# Patient Record
Sex: FEMALE | Race: WHITE | NOT HISPANIC OR LATINO | URBAN - METROPOLITAN AREA
[De-identification: names, ages, dates, MRNs, and addresses within clinical notes are randomized per-mention and may not be internally consistent; named-entity substitution may affect disease eponyms.]

---

## 2017-04-03 ENCOUNTER — OUTPATIENT (OUTPATIENT)
Dept: OUTPATIENT SERVICES | Facility: HOSPITAL | Age: 62
LOS: 1 days | Discharge: HOME | End: 2017-04-03

## 2017-06-27 DIAGNOSIS — N39.0 URINARY TRACT INFECTION, SITE NOT SPECIFIED: ICD-10-CM

## 2018-04-06 PROBLEM — Z00.00 ENCOUNTER FOR PREVENTIVE HEALTH EXAMINATION: Status: ACTIVE | Noted: 2018-04-06

## 2018-04-30 ENCOUNTER — APPOINTMENT (OUTPATIENT)
Age: 63
End: 2018-04-30

## 2018-04-30 ENCOUNTER — OUTPATIENT (OUTPATIENT)
Dept: OUTPATIENT SERVICES | Facility: HOSPITAL | Age: 63
LOS: 1 days | Discharge: HOME | End: 2018-04-30

## 2018-04-30 VITALS
HEART RATE: 63 BPM | DIASTOLIC BLOOD PRESSURE: 80 MMHG | SYSTOLIC BLOOD PRESSURE: 134 MMHG | BODY MASS INDEX: 23.23 KG/M2 | WEIGHT: 148 LBS | HEIGHT: 67 IN

## 2018-04-30 DIAGNOSIS — N95.2 POSTMENOPAUSAL ATROPHIC VAGINITIS: ICD-10-CM

## 2018-04-30 DIAGNOSIS — Z86.39 PERSONAL HISTORY OF OTHER ENDOCRINE, NUTRITIONAL AND METABOLIC DISEASE: ICD-10-CM

## 2018-04-30 DIAGNOSIS — Z82.49 FAMILY HISTORY OF ISCHEMIC HEART DISEASE AND OTHER DISEASES OF THE CIRCULATORY SYSTEM: ICD-10-CM

## 2018-04-30 DIAGNOSIS — Z78.9 OTHER SPECIFIED HEALTH STATUS: ICD-10-CM

## 2018-04-30 RX ORDER — OMEPRAZOLE, SODIUM BICARBONATE 40; 1100 MG/1; MG/1
40-1100 CAPSULE ORAL
Refills: 0 | Status: ACTIVE | COMMUNITY

## 2018-04-30 RX ORDER — ALPRAZOLAM 0.25 MG/1
0.25 TABLET ORAL
Refills: 0 | Status: ACTIVE | COMMUNITY

## 2018-05-01 DIAGNOSIS — R39.11 HESITANCY OF MICTURITION: ICD-10-CM

## 2018-05-02 LAB — BACTERIA UR CULT: NORMAL

## 2018-05-07 ENCOUNTER — APPOINTMENT (OUTPATIENT)
Age: 63
End: 2018-05-07

## 2018-05-11 LAB
APPEARANCE: CLEAR
BILIRUBIN URINE: NEGATIVE
BLOOD URINE: NEGATIVE
COLOR: YELLOW
GLUCOSE QUALITATIVE U: NEGATIVE MG/DL
KETONES URINE: NEGATIVE
LEUKOCYTE ESTERASE URINE: NEGATIVE
NITRITE URINE: NEGATIVE
PH URINE: 6.5
PROTEIN URINE: NEGATIVE MG/DL
SPECIFIC GRAVITY URINE: 1
UROBILINOGEN URINE: NEGATIVE MG/DL

## 2018-09-06 ENCOUNTER — APPOINTMENT (OUTPATIENT)
Age: 63
End: 2018-09-06

## 2018-10-04 ENCOUNTER — APPOINTMENT (OUTPATIENT)
Age: 63
End: 2018-10-04

## 2018-10-04 VITALS
DIASTOLIC BLOOD PRESSURE: 80 MMHG | BODY MASS INDEX: 23.23 KG/M2 | WEIGHT: 148 LBS | SYSTOLIC BLOOD PRESSURE: 130 MMHG | HEART RATE: 65 BPM | HEIGHT: 67 IN

## 2018-10-04 DIAGNOSIS — N81.2 INCOMPLETE UTEROVAGINAL PROLAPSE: ICD-10-CM

## 2018-10-04 DIAGNOSIS — N81.4 UTEROVAGINAL PROLAPSE, UNSPECIFIED: ICD-10-CM

## 2018-10-11 ENCOUNTER — CHART COPY (OUTPATIENT)
Age: 63
End: 2018-10-11

## 2018-10-16 ENCOUNTER — OUTPATIENT (OUTPATIENT)
Dept: OUTPATIENT SERVICES | Facility: HOSPITAL | Age: 63
LOS: 1 days | Discharge: HOME | End: 2018-10-16

## 2018-10-16 VITALS
SYSTOLIC BLOOD PRESSURE: 114 MMHG | DIASTOLIC BLOOD PRESSURE: 77 MMHG | WEIGHT: 147.93 LBS | OXYGEN SATURATION: 99 % | HEART RATE: 65 BPM | TEMPERATURE: 97 F | RESPIRATION RATE: 16 BRPM | HEIGHT: 67 IN

## 2018-10-16 DIAGNOSIS — Z01.818 ENCOUNTER FOR OTHER PREPROCEDURAL EXAMINATION: ICD-10-CM

## 2018-10-16 DIAGNOSIS — N81.3 COMPLETE UTEROVAGINAL PROLAPSE: ICD-10-CM

## 2018-10-16 DIAGNOSIS — N81.10 CYSTOCELE, UNSPECIFIED: Chronic | ICD-10-CM

## 2018-10-16 DIAGNOSIS — Z96.7 PRESENCE OF OTHER BONE AND TENDON IMPLANTS: Chronic | ICD-10-CM

## 2018-10-16 LAB
ALBUMIN SERPL ELPH-MCNC: 4.6 G/DL — SIGNIFICANT CHANGE UP (ref 3.5–5.2)
ALP SERPL-CCNC: 106 U/L — SIGNIFICANT CHANGE UP (ref 30–115)
ALT FLD-CCNC: 13 U/L — SIGNIFICANT CHANGE UP (ref 0–41)
ANION GAP SERPL CALC-SCNC: 13 MMOL/L — SIGNIFICANT CHANGE UP (ref 7–14)
APPEARANCE UR: CLEAR — SIGNIFICANT CHANGE UP
APTT BLD: 36.5 SEC — SIGNIFICANT CHANGE UP (ref 27–39.2)
AST SERPL-CCNC: 14 U/L — SIGNIFICANT CHANGE UP (ref 0–41)
BASOPHILS # BLD AUTO: 0.06 K/UL — SIGNIFICANT CHANGE UP (ref 0–0.2)
BASOPHILS NFR BLD AUTO: 0.9 % — SIGNIFICANT CHANGE UP (ref 0–1)
BILIRUB SERPL-MCNC: 0.6 MG/DL — SIGNIFICANT CHANGE UP (ref 0.2–1.2)
BILIRUB UR-MCNC: NEGATIVE — SIGNIFICANT CHANGE UP
BLD GP AB SCN SERPL QL: SIGNIFICANT CHANGE UP
BUN SERPL-MCNC: 17 MG/DL — SIGNIFICANT CHANGE UP (ref 10–20)
CALCIUM SERPL-MCNC: 9.5 MG/DL — SIGNIFICANT CHANGE UP (ref 8.5–10.1)
CHLORIDE SERPL-SCNC: 99 MMOL/L — SIGNIFICANT CHANGE UP (ref 98–110)
CO2 SERPL-SCNC: 29 MMOL/L — SIGNIFICANT CHANGE UP (ref 17–32)
COLOR SPEC: YELLOW — SIGNIFICANT CHANGE UP
CREAT SERPL-MCNC: 0.8 MG/DL — SIGNIFICANT CHANGE UP (ref 0.7–1.5)
DIFF PNL FLD: ABNORMAL
EOSINOPHIL # BLD AUTO: 0.04 K/UL — SIGNIFICANT CHANGE UP (ref 0–0.7)
EOSINOPHIL NFR BLD AUTO: 0.6 % — SIGNIFICANT CHANGE UP (ref 0–8)
EPI CELLS # UR: ABNORMAL /HPF
GLUCOSE SERPL-MCNC: 85 MG/DL — SIGNIFICANT CHANGE UP (ref 70–99)
GLUCOSE UR QL: NEGATIVE MG/DL — SIGNIFICANT CHANGE UP
HCT VFR BLD CALC: 34.2 % — LOW (ref 37–47)
HGB BLD-MCNC: 11 G/DL — LOW (ref 12–16)
IMM GRANULOCYTES NFR BLD AUTO: 0.1 % — SIGNIFICANT CHANGE UP (ref 0.1–0.3)
INR BLD: 1.17 RATIO — SIGNIFICANT CHANGE UP (ref 0.65–1.3)
KETONES UR-MCNC: NEGATIVE — SIGNIFICANT CHANGE UP
LEUKOCYTE ESTERASE UR-ACNC: NEGATIVE — SIGNIFICANT CHANGE UP
LYMPHOCYTES # BLD AUTO: 2.45 K/UL — SIGNIFICANT CHANGE UP (ref 1.2–3.4)
LYMPHOCYTES # BLD AUTO: 35.3 % — SIGNIFICANT CHANGE UP (ref 20.5–51.1)
MCHC RBC-ENTMCNC: 27.8 PG — SIGNIFICANT CHANGE UP (ref 27–31)
MCHC RBC-ENTMCNC: 32.2 G/DL — SIGNIFICANT CHANGE UP (ref 32–37)
MCV RBC AUTO: 86.4 FL — SIGNIFICANT CHANGE UP (ref 81–99)
MONOCYTES # BLD AUTO: 0.53 K/UL — SIGNIFICANT CHANGE UP (ref 0.1–0.6)
MONOCYTES NFR BLD AUTO: 7.6 % — SIGNIFICANT CHANGE UP (ref 1.7–9.3)
NEUTROPHILS # BLD AUTO: 3.85 K/UL — SIGNIFICANT CHANGE UP (ref 1.4–6.5)
NEUTROPHILS NFR BLD AUTO: 55.5 % — SIGNIFICANT CHANGE UP (ref 42.2–75.2)
NITRITE UR-MCNC: NEGATIVE — SIGNIFICANT CHANGE UP
NRBC # BLD: 0 /100 WBCS — SIGNIFICANT CHANGE UP (ref 0–0)
PH UR: 6 — SIGNIFICANT CHANGE UP (ref 5–8)
PLATELET # BLD AUTO: 296 K/UL — SIGNIFICANT CHANGE UP (ref 130–400)
POTASSIUM SERPL-MCNC: 4.3 MMOL/L — SIGNIFICANT CHANGE UP (ref 3.5–5)
POTASSIUM SERPL-SCNC: 4.3 MMOL/L — SIGNIFICANT CHANGE UP (ref 3.5–5)
PROT SERPL-MCNC: 7.3 G/DL — SIGNIFICANT CHANGE UP (ref 6–8)
PROT UR-MCNC: NEGATIVE MG/DL — SIGNIFICANT CHANGE UP
PROTHROM AB SERPL-ACNC: 13.4 SEC — HIGH (ref 9.95–12.87)
RBC # BLD: 3.96 M/UL — LOW (ref 4.2–5.4)
RBC # FLD: 12.5 % — SIGNIFICANT CHANGE UP (ref 11.5–14.5)
RBC CASTS # UR COMP ASSIST: SIGNIFICANT CHANGE UP /HPF
SODIUM SERPL-SCNC: 141 MMOL/L — SIGNIFICANT CHANGE UP (ref 135–146)
SP GR SPEC: 1.01 — SIGNIFICANT CHANGE UP (ref 1.01–1.03)
TYPE + AB SCN PNL BLD: SIGNIFICANT CHANGE UP
UROBILINOGEN FLD QL: 0.2 MG/DL — SIGNIFICANT CHANGE UP (ref 0.2–0.2)
WBC # BLD: 6.94 K/UL — SIGNIFICANT CHANGE UP (ref 4.8–10.8)
WBC # FLD AUTO: 6.94 K/UL — SIGNIFICANT CHANGE UP (ref 4.8–10.8)
WBC UR QL: SIGNIFICANT CHANGE UP /HPF

## 2018-10-16 NOTE — H&P PST ADULT - PSH
Female bladder prolapse  s/p repair 15 yrs ago  S/P ORIF (open reduction internal fixation) fracture  left wrist

## 2018-10-16 NOTE — H&P PST ADULT - PMH
Cellulitis  3x past 9 mo right foot& ankle, resolved  GERD (gastroesophageal reflux disease)    HTN (hypertension)    IBS (irritable bowel syndrome)

## 2018-10-16 NOTE — H&P PST ADULT - REASON FOR ADMISSION
62 yo female presents w/ c/o uterine& vaginal prolapse, pt is scheduled for hysterectomy& "lift";  denies chest pain, palpitations, shortness of breath, dyspnea, or dysuria. exercise tolerance: 4 blocks/ flights of stairs w/o sob

## 2018-10-17 LAB
CULTURE RESULTS: SIGNIFICANT CHANGE UP
SPECIMEN SOURCE: SIGNIFICANT CHANGE UP

## 2018-10-29 ENCOUNTER — APPOINTMENT (OUTPATIENT)
Age: 63
End: 2018-10-29

## 2018-10-30 ENCOUNTER — OUTPATIENT (OUTPATIENT)
Dept: OUTPATIENT SERVICES | Facility: HOSPITAL | Age: 63
LOS: 1 days | Discharge: HOME | End: 2018-10-30

## 2018-10-30 DIAGNOSIS — Z96.7 PRESENCE OF OTHER BONE AND TENDON IMPLANTS: Chronic | ICD-10-CM

## 2018-10-30 DIAGNOSIS — N81.10 CYSTOCELE, UNSPECIFIED: Chronic | ICD-10-CM

## 2018-10-31 PROBLEM — I10 ESSENTIAL (PRIMARY) HYPERTENSION: Chronic | Status: ACTIVE | Noted: 2018-10-16

## 2018-10-31 PROBLEM — K58.9 IRRITABLE BOWEL SYNDROME WITHOUT DIARRHEA: Chronic | Status: ACTIVE | Noted: 2018-10-16

## 2018-10-31 PROBLEM — L03.90 CELLULITIS, UNSPECIFIED: Chronic | Status: ACTIVE | Noted: 2018-10-16

## 2018-10-31 PROBLEM — K21.9 GASTRO-ESOPHAGEAL REFLUX DISEASE WITHOUT ESOPHAGITIS: Chronic | Status: ACTIVE | Noted: 2018-10-16

## 2018-11-01 ENCOUNTER — CLINICAL ADVICE (OUTPATIENT)
Age: 63
End: 2018-11-01

## 2018-11-05 DIAGNOSIS — Z02.9 ENCOUNTER FOR ADMINISTRATIVE EXAMINATIONS, UNSPECIFIED: ICD-10-CM

## 2018-11-13 ENCOUNTER — INPATIENT (INPATIENT)
Facility: HOSPITAL | Age: 63
LOS: 1 days | Discharge: HOME | End: 2018-11-15
Attending: OBSTETRICS & GYNECOLOGY | Admitting: OBSTETRICS & GYNECOLOGY

## 2018-11-13 ENCOUNTER — RESULT REVIEW (OUTPATIENT)
Age: 63
End: 2018-11-13

## 2018-11-13 VITALS
HEIGHT: 67 IN | SYSTOLIC BLOOD PRESSURE: 143 MMHG | TEMPERATURE: 98 F | WEIGHT: 147.05 LBS | DIASTOLIC BLOOD PRESSURE: 80 MMHG | HEART RATE: 64 BPM | RESPIRATION RATE: 18 BRPM

## 2018-11-13 DIAGNOSIS — Z96.7 PRESENCE OF OTHER BONE AND TENDON IMPLANTS: Chronic | ICD-10-CM

## 2018-11-13 DIAGNOSIS — N81.10 CYSTOCELE, UNSPECIFIED: Chronic | ICD-10-CM

## 2018-11-13 LAB
BLD GP AB SCN SERPL QL: SIGNIFICANT CHANGE UP
GLUCOSE BLDC GLUCOMTR-MCNC: 172 MG/DL — HIGH (ref 70–99)
TYPE + AB SCN PNL BLD: SIGNIFICANT CHANGE UP

## 2018-11-13 RX ORDER — PANTOPRAZOLE SODIUM 20 MG/1
40 TABLET, DELAYED RELEASE ORAL AT BEDTIME
Qty: 0 | Refills: 0 | Status: DISCONTINUED | OUTPATIENT
Start: 2018-11-13 | End: 2018-11-15

## 2018-11-13 RX ORDER — ONDANSETRON 8 MG/1
8 TABLET, FILM COATED ORAL ONCE
Qty: 0 | Refills: 0 | Status: DISCONTINUED | OUTPATIENT
Start: 2018-11-13 | End: 2018-11-13

## 2018-11-13 RX ORDER — ENOXAPARIN SODIUM 100 MG/ML
40 INJECTION SUBCUTANEOUS DAILY
Qty: 0 | Refills: 0 | Status: DISCONTINUED | OUTPATIENT
Start: 2018-11-14 | End: 2018-11-15

## 2018-11-13 RX ORDER — CICLOPIROX OLAMINE 7.7 MG/G
1 CREAM TOPICAL
Qty: 0 | Refills: 0 | COMMUNITY

## 2018-11-13 RX ORDER — CHLORPROMAZINE HCL 10 MG
5 TABLET ORAL ONCE
Qty: 0 | Refills: 0 | Status: DISCONTINUED | OUTPATIENT
Start: 2018-11-13 | End: 2018-11-15

## 2018-11-13 RX ORDER — ALPRAZOLAM 0.25 MG
0.12 TABLET ORAL
Qty: 0 | Refills: 0 | COMMUNITY

## 2018-11-13 RX ORDER — METOCLOPRAMIDE HCL 10 MG
10 TABLET ORAL ONCE
Qty: 0 | Refills: 0 | Status: COMPLETED | OUTPATIENT
Start: 2018-11-13 | End: 2018-11-13

## 2018-11-13 RX ORDER — SODIUM CHLORIDE 9 MG/ML
1000 INJECTION, SOLUTION INTRAVENOUS
Qty: 0 | Refills: 0 | Status: DISCONTINUED | OUTPATIENT
Start: 2018-11-13 | End: 2018-11-13

## 2018-11-13 RX ORDER — SODIUM CHLORIDE 9 MG/ML
1000 INJECTION, SOLUTION INTRAVENOUS
Qty: 0 | Refills: 0 | Status: DISCONTINUED | OUTPATIENT
Start: 2018-11-13 | End: 2018-11-14

## 2018-11-13 RX ORDER — HYDROMORPHONE HYDROCHLORIDE 2 MG/ML
0.5 INJECTION INTRAMUSCULAR; INTRAVENOUS; SUBCUTANEOUS
Qty: 0 | Refills: 0 | Status: DISCONTINUED | OUTPATIENT
Start: 2018-11-13 | End: 2018-11-13

## 2018-11-13 RX ORDER — ENOXAPARIN SODIUM 100 MG/ML
40 INJECTION SUBCUTANEOUS ONCE
Qty: 0 | Refills: 0 | Status: COMPLETED | OUTPATIENT
Start: 2018-11-13 | End: 2018-11-13

## 2018-11-13 RX ORDER — APREPITANT 80 MG/1
40 CAPSULE ORAL ONCE
Qty: 0 | Refills: 0 | Status: COMPLETED | OUTPATIENT
Start: 2018-11-13 | End: 2018-11-13

## 2018-11-13 RX ORDER — FAMOTIDINE 10 MG/ML
0 INJECTION INTRAVENOUS
Qty: 0 | Refills: 0 | COMMUNITY

## 2018-11-13 RX ORDER — DEXAMETHASONE 0.5 MG/5ML
4 ELIXIR ORAL ONCE
Qty: 0 | Refills: 0 | Status: COMPLETED | OUTPATIENT
Start: 2018-11-13 | End: 2018-11-13

## 2018-11-13 RX ORDER — PHENAZOPYRIDINE HCL 100 MG
200 TABLET ORAL ONCE
Qty: 0 | Refills: 0 | Status: COMPLETED | OUTPATIENT
Start: 2018-11-13 | End: 2018-11-13

## 2018-11-13 RX ORDER — IBUPROFEN 200 MG
600 TABLET ORAL EVERY 6 HOURS
Qty: 0 | Refills: 0 | Status: DISCONTINUED | OUTPATIENT
Start: 2018-11-13 | End: 2018-11-15

## 2018-11-13 RX ORDER — ACETAMINOPHEN 500 MG
650 TABLET ORAL EVERY 6 HOURS
Qty: 0 | Refills: 0 | Status: DISCONTINUED | OUTPATIENT
Start: 2018-11-13 | End: 2018-11-15

## 2018-11-13 RX ORDER — HYDROMORPHONE HYDROCHLORIDE 2 MG/ML
2 INJECTION INTRAMUSCULAR; INTRAVENOUS; SUBCUTANEOUS EVERY 6 HOURS
Qty: 0 | Refills: 0 | Status: DISCONTINUED | OUTPATIENT
Start: 2018-11-13 | End: 2018-11-15

## 2018-11-13 RX ORDER — MEPERIDINE HYDROCHLORIDE 50 MG/ML
12.5 INJECTION INTRAMUSCULAR; INTRAVENOUS; SUBCUTANEOUS
Qty: 0 | Refills: 0 | Status: DISCONTINUED | OUTPATIENT
Start: 2018-11-13 | End: 2018-11-13

## 2018-11-13 RX ORDER — ONDANSETRON 8 MG/1
4 TABLET, FILM COATED ORAL ONCE
Qty: 0 | Refills: 0 | Status: COMPLETED | OUTPATIENT
Start: 2018-11-13 | End: 2018-11-13

## 2018-11-13 RX ORDER — HYDROMORPHONE HYDROCHLORIDE 2 MG/ML
1 INJECTION INTRAMUSCULAR; INTRAVENOUS; SUBCUTANEOUS
Qty: 0 | Refills: 0 | Status: DISCONTINUED | OUTPATIENT
Start: 2018-11-13 | End: 2018-11-13

## 2018-11-13 RX ADMIN — HYDROMORPHONE HYDROCHLORIDE 1 MILLIGRAM(S): 2 INJECTION INTRAMUSCULAR; INTRAVENOUS; SUBCUTANEOUS at 22:25

## 2018-11-13 RX ADMIN — SODIUM CHLORIDE 100 MILLILITER(S): 9 INJECTION, SOLUTION INTRAVENOUS at 22:39

## 2018-11-13 RX ADMIN — Medication 4 MILLIGRAM(S): at 22:13

## 2018-11-13 RX ADMIN — HYDROMORPHONE HYDROCHLORIDE 1 MILLIGRAM(S): 2 INJECTION INTRAMUSCULAR; INTRAVENOUS; SUBCUTANEOUS at 21:49

## 2018-11-13 RX ADMIN — ENOXAPARIN SODIUM 40 MILLIGRAM(S): 100 INJECTION SUBCUTANEOUS at 15:40

## 2018-11-13 RX ADMIN — PANTOPRAZOLE SODIUM 40 MILLIGRAM(S): 20 TABLET, DELAYED RELEASE ORAL at 22:17

## 2018-11-13 RX ADMIN — SODIUM CHLORIDE 120 MILLILITER(S): 9 INJECTION, SOLUTION INTRAVENOUS at 22:00

## 2018-11-13 RX ADMIN — APREPITANT 40 MILLIGRAM(S): 80 CAPSULE ORAL at 15:41

## 2018-11-13 RX ADMIN — MEPERIDINE HYDROCHLORIDE 12.5 MILLIGRAM(S): 50 INJECTION INTRAMUSCULAR; INTRAVENOUS; SUBCUTANEOUS at 22:05

## 2018-11-13 RX ADMIN — ONDANSETRON 4 MILLIGRAM(S): 8 TABLET, FILM COATED ORAL at 23:07

## 2018-11-13 RX ADMIN — HYDROMORPHONE HYDROCHLORIDE 1 MILLIGRAM(S): 2 INJECTION INTRAMUSCULAR; INTRAVENOUS; SUBCUTANEOUS at 22:05

## 2018-11-13 RX ADMIN — MEPERIDINE HYDROCHLORIDE 12.5 MILLIGRAM(S): 50 INJECTION INTRAMUSCULAR; INTRAVENOUS; SUBCUTANEOUS at 22:20

## 2018-11-13 RX ADMIN — Medication 104 MILLIGRAM(S): at 21:49

## 2018-11-13 RX ADMIN — Medication 200 MILLIGRAM(S): at 12:54

## 2018-11-13 RX ADMIN — MEPERIDINE HYDROCHLORIDE 12.5 MILLIGRAM(S): 50 INJECTION INTRAMUSCULAR; INTRAVENOUS; SUBCUTANEOUS at 21:50

## 2018-11-13 NOTE — CHART NOTE - NSCHARTNOTEFT_GEN_A_CORE
PACU ANESTHESIA ADMISSION NOTE      ____ Intubated  TV:______       Rate: ______      FiO2: ______    __x__ Patent Airway    __x__ Full return of protective reflexes    ____ Full recovery from anesthesia / sedation to baseline status    Vitals:  HR 93  /66  RR 15  O2sat. 97%  Temp: 36.8C      Mental Status:  __x__ Awake   __x___ Alert   _____ Drowsy   _____ Sedated    Nausea/Vomiting: ____ Yes, See Post - Op Orders      __x__ No    Pain Scale (0-10): __x___    Treatment: ____ None    _x___ See Post - Op/PCA Orders    Post - Operative Fluids:   ____ Oral   __x__ See Post - Op Orders    Plan:  Discharge to:   ____Home       ___x__Floor      _____Critical Care    _____ Other:_________________    Comments: s/p general anesthesia with ETT. No anesthesia complications. Pt's condition is stable in PACU. Full report is given to PACU RN.

## 2018-11-13 NOTE — PROGRESS NOTE ADULT - SUBJECTIVE AND OBJECTIVE BOX
DATE OF PROCEDURE: 11/13/2018  SURGEON: Elvira Lopez MD   ASSISTANT: Dr Mendle Hollis PGY 4, Dr Dalton Lord PGY 2    PREOPERATIVE DIAGNOSES:  1. Uterovaginal prolapse, incomplete     POSTOPERATIVE DIAGNOSES:   1. Uterovaginal prolapse, incomplete    PROCEDURES PERFORMED:  1. Robotic-assisted total laparoscopic hysterectomy  2. Robotic-assisted sacral colpopexy.   3. Cystourethroscopy.     COMPLICATIONS: None.   DISPOSITION: Stable to PACU.   DRAINS: Matthew catheter.   ANESTHESIA: General via an endotracheal tube   INTRAVENOUS FLUIDS: 2000 mL crystalloid.   ESTIMATED BLOOD LOSS: 150 mL.   URINE OUTPUT: 800 mL.   SPECIMENS: Uterus, cervix   FINDINGS: Cystocele, uterine prolapse, as well as rectocele.  Intraperitoneal findings revealed a normal-sized uterus and normal tubes  and ovaries bilaterally. The vaginal vault, as well as the rectal vault  were examined at the end of the case and noted to be free of mesh exposure,  perforation, or foreign body.     INDICATION: The patient is a 63-year-old woman who presented with pelvic organ prolapse that had become increasingly bothersome and impacting her quality of life. She was noted to have a symptomatic cystocele, rectocele and uterine prolapse. There was no evidence of preoperative stress urinary incontinence. After a thorough discussion of her options, both conservative and surgical, the patient elected to proceed with surgery. Informed consent was obtained for the above-stated procedures. All of her questions were answered to her satisfaction. The patient expressed understanding and agreement to surgical plan. The rationale usage of synthetic polypropylene type 1 macroporous mesh was discussed and the safety profile when used in the setting of a robotic-assisted sacral colpopexy was explained to her understanding. The risks and benefits were discussed. The patient was counseled about removal of the tubes and the ovaries since she was postmenopausal to decrease the risk of ovarian cancer. The patient and her  voiced understanding but did not want the removal of the tubes and ovaries unless they looked abnormal.The patient agreed to proceed.     DESCRIPTION OF PROCEDURE: The patient was brought to the operating room where she was placed in the supine position. Sequential compression devices were placed on both lower extremities for DVT prophylaxis. The patient was repositioned in lithotomy using hydraulic Sarmad stirrups. General anesthesia was then administered via an endotracheal tube and found to be adequate. The patient received antibiotics for an infectious prophylaxis. She was then examined, prepped and draped in the usual sterile fashion. A  formal timeout was performed with all surgical team members present and in agreement with the surgical plan.  A Matthew catheter was inserted under sterile technique to drain the bladder. Attention was then turned to the patient's vagina. A standard sized VCare manipulator was placed transcervically in order to aid with manipulation of the uterus intraoperatively. Attention was then turned to the anterior abdominal wall. A Veress needle was placed transabdominally and CO2 pneumoperitoneum was achieved. The skin incisions were placed in the usual fashion supraumbilically as well as the right and left upper quadrants and the ports were placed under direct laparoscopic guidance. Ports were placed and secured. The patient was then placed in steep Trendelenburg and the robot was side docked to the patient's left side. The robotic instruments were secured and the patient was secured. I then broke scrub and sat at the robotic physician console. Attention was initially turned to surveying the patient's pelvic floor anatomy. The uterus, cervix, bilateral fallopian tubes and ovaries appeared normal grossly. The ureters were examined and noted to be vermiculating in their usual anatomic location. Beginning on the patient's left side, the  round ligament was desiccated, , transected, and developed. The anterior and posterior leaflets of the broad ligament were developed. The infundibulopelvic ligament was then placed onto stretch, desiccated and met at the level of the broad ligament. The left side of the uterus was then skeletonized. The anterior and posterior leaflets of the broad ligament were further developed. Anteriorly, the bladder flap was created. The rectovaginal and vesicovaginal spaces were developed posteriorly. The uterine artery was isolated. The uterine artery was then desiccated thoroughly and there was some blanching noted on the patient's left side of the uterus. Similar maneuvers were performed on the patient's right side. Once there was blanching noted, a colpotomy was performed circumferentially at the level of the VCare ring prominence. The uterus and cervix were then delivered through the vagina. The vaginal cuff was  then reapproximated using a 0 quill suture in a running fashion in a tension-free manner. Another imbricating layer was then performed using the same 0 quill suture and this was well approximated.   There was some moderate bleeding from the left side wall at the level of the distal portion of the uterine. Further dissection of the ureter at that side then allowed me to cauterize the vessel and hemostasis was established. I placed surgical at that location.    I then turned my attention to performing the sacrocolpopexy. An EEA sizer was placed in the vagina. The rectovaginal space was initially developed using sharp and blunt dissection. Bleeders were made hemostatic using electrocautery. Similarly, the vesicovaginal space was developed using blunt and sharp dissection and bleeding was made hemostatic using electrocautery. I then turned my attention towards the sacral promontory. The peritoneum overlying the sacral promontory was palpated, elevated and then skeletonized downward in a caudate fashion to develop the presacral and retrorectal spaces. Attention was then turned back to the vagina. Beginning with the posterior vaginal wall, the Gynemesh material was  trimmed and brought to the field 3 cm x 15 cm. A CV2 Fargo-Ousmane suture was then used to place 6 sutures of CV2 in an interrupted fashion to adequately distribute the tension. Similarly, a 3 cm strip of Gynemesh was brought into the field and attached to the anterior vaginal wall using CV2 Fargo-Ousmane sutures. Both tails were attached to each other and held. A  monoderm quill suture was then used to retroperitonealize the mesh on the patient's left side. The tail was then brought towards the sacral promontory and sutured  to the sacral promontory in a tension-free manner using 2 separate CVO Fargo-Ousmane sutures with the aid of an EEA sizer in the vagina. The rest of the mesh was trimmed. The remainder of the mesh was then  retroperitonealized using a monoderm quill suture on the right side of the vagina towards the sacral promontory. Irrigation was performed. Hemostasis was confirmed. Instruments, needles and lap counts were noted to be correct. At this point, I then scrubbed back into the surgical field and  left the physician robotic console.     The vagina was inspected and noted to be free of mesh exposure or suture. A rectal exam was performed which revealed no foreign body in the rectal vault. The Matthew catheter was then removed. A  cystourethroscopy was performed using a 22 Kiswahili 30 degree cystoscope. The bladder was instilled  with sterile water and examined thoroughly in a standard fashion making sequential sweeps from the dome to the urethrovesical junction clockwise and then counterclockwise. All areas of the bladder appeared intact. The urothelium and bladder wall appeared normal. There was no foreign body in the  bladder. There was no perforation of the bladder. There was excellent efflux of Pyridium tinged urine from each ureteral orifice. The urethral lumen was examined under distention as the cystoscope was retracted and noted to be free of mesh exposure, perforation or abnormality. The cystoscope was then removed and a new Matthew catheter was then placed. While the abdominal  undocked the robot, released CO2 pneumoperitoneum, removed the ports and reapproximated the skin incisions subcuticularly with Monocryl, I deviated my attention vaginally. There was excellent support of the anterior and apical and posterior compartments of the vagina. There was no need for a repair vaginally. The vagina was reinspected and noted to be free of mesh exposure or suture.    Ray-Taqueria, needle and instrument counts were noted to be correct x 2. The patient tolerated the procedure well. Vaginal exam revealed no evidence of any mesh exposure or ridging inside the vagina.  A rectal exam revealed no foreign body in the rectal vault. The patient was then awoken, extubated and transferred to the PACU in stable condition.

## 2018-11-13 NOTE — BRIEF OPERATIVE NOTE - OPERATION/FINDINGS
EUA: anterior prolapse and cervical prolapse, approximately 5 cm   Cystoscopy: bilateral jets seen, normal bladder  Laparoscopy: bilaterally normal tubes and ovaries, small uterus, approximately 4 weeks in size

## 2018-11-13 NOTE — BRIEF OPERATIVE NOTE - PROCEDURE
<<-----Click on this checkbox to enter Procedure Cystoscopy  11/13/2018    Active  JOSEU  Sacrocolpopexy, abdomen, robot-assisted, laparoscopic  11/13/2018    Active  JOSEU  Robot-assisted hysterectomy  11/13/2018    Active  JOSEU

## 2018-11-14 LAB
ANION GAP SERPL CALC-SCNC: 17 MMOL/L — HIGH (ref 7–14)
BASOPHILS # BLD AUTO: 0.01 K/UL — SIGNIFICANT CHANGE UP (ref 0–0.2)
BASOPHILS NFR BLD AUTO: 0.1 % — SIGNIFICANT CHANGE UP (ref 0–1)
BUN SERPL-MCNC: 12 MG/DL — SIGNIFICANT CHANGE UP (ref 10–20)
CALCIUM SERPL-MCNC: 9.5 MG/DL — SIGNIFICANT CHANGE UP (ref 8.5–10.1)
CHLORIDE SERPL-SCNC: 98 MMOL/L — SIGNIFICANT CHANGE UP (ref 98–110)
CO2 SERPL-SCNC: 26 MMOL/L — SIGNIFICANT CHANGE UP (ref 17–32)
CREAT SERPL-MCNC: 0.7 MG/DL — SIGNIFICANT CHANGE UP (ref 0.7–1.5)
EOSINOPHIL # BLD AUTO: 0 K/UL — SIGNIFICANT CHANGE UP (ref 0–0.7)
EOSINOPHIL NFR BLD AUTO: 0 % — SIGNIFICANT CHANGE UP (ref 0–8)
GLUCOSE SERPL-MCNC: 164 MG/DL — HIGH (ref 70–99)
HCT VFR BLD CALC: 33 % — LOW (ref 37–47)
HGB BLD-MCNC: 10.5 G/DL — LOW (ref 12–16)
IMM GRANULOCYTES NFR BLD AUTO: 0.5 % — HIGH (ref 0.1–0.3)
LYMPHOCYTES # BLD AUTO: 1.05 K/UL — LOW (ref 1.2–3.4)
LYMPHOCYTES # BLD AUTO: 7.9 % — LOW (ref 20.5–51.1)
MCHC RBC-ENTMCNC: 27.1 PG — SIGNIFICANT CHANGE UP (ref 27–31)
MCHC RBC-ENTMCNC: 31.8 G/DL — LOW (ref 32–37)
MCV RBC AUTO: 85.3 FL — SIGNIFICANT CHANGE UP (ref 81–99)
MONOCYTES # BLD AUTO: 0.49 K/UL — SIGNIFICANT CHANGE UP (ref 0.1–0.6)
MONOCYTES NFR BLD AUTO: 3.7 % — SIGNIFICANT CHANGE UP (ref 1.7–9.3)
NEUTROPHILS # BLD AUTO: 11.71 K/UL — HIGH (ref 1.4–6.5)
NEUTROPHILS NFR BLD AUTO: 87.8 % — HIGH (ref 42.2–75.2)
NRBC # BLD: 0 /100 WBCS — SIGNIFICANT CHANGE UP (ref 0–0)
PLATELET # BLD AUTO: 263 K/UL — SIGNIFICANT CHANGE UP (ref 130–400)
POTASSIUM SERPL-MCNC: 3.2 MMOL/L — LOW (ref 3.5–5)
POTASSIUM SERPL-SCNC: 3.2 MMOL/L — LOW (ref 3.5–5)
RBC # BLD: 3.87 M/UL — LOW (ref 4.2–5.4)
RBC # FLD: 12.4 % — SIGNIFICANT CHANGE UP (ref 11.5–14.5)
SODIUM SERPL-SCNC: 141 MMOL/L — SIGNIFICANT CHANGE UP (ref 135–146)
WBC # BLD: 13.33 K/UL — HIGH (ref 4.8–10.8)
WBC # FLD AUTO: 13.33 K/UL — HIGH (ref 4.8–10.8)

## 2018-11-14 RX ORDER — MECLIZINE HCL 12.5 MG
25 TABLET ORAL ONCE
Qty: 0 | Refills: 0 | Status: COMPLETED | OUTPATIENT
Start: 2018-11-14 | End: 2018-11-14

## 2018-11-14 RX ORDER — SODIUM CHLORIDE 9 MG/ML
3 INJECTION INTRAMUSCULAR; INTRAVENOUS; SUBCUTANEOUS EVERY 8 HOURS
Qty: 0 | Refills: 0 | Status: DISCONTINUED | OUTPATIENT
Start: 2018-11-14 | End: 2018-11-15

## 2018-11-14 RX ORDER — POTASSIUM CHLORIDE 20 MEQ
20 PACKET (EA) ORAL ONCE
Qty: 0 | Refills: 0 | Status: COMPLETED | OUTPATIENT
Start: 2018-11-14 | End: 2018-11-14

## 2018-11-14 RX ORDER — METOCLOPRAMIDE HCL 10 MG
10 TABLET ORAL ONCE
Qty: 0 | Refills: 0 | Status: COMPLETED | OUTPATIENT
Start: 2018-11-14 | End: 2018-11-14

## 2018-11-14 RX ORDER — METOCLOPRAMIDE HCL 10 MG
10 TABLET ORAL ONCE
Qty: 0 | Refills: 0 | Status: DISCONTINUED | OUTPATIENT
Start: 2018-11-14 | End: 2018-11-15

## 2018-11-14 RX ORDER — POTASSIUM CHLORIDE 20 MEQ
40 PACKET (EA) ORAL ONCE
Qty: 0 | Refills: 0 | Status: DISCONTINUED | OUTPATIENT
Start: 2018-11-14 | End: 2018-11-14

## 2018-11-14 RX ORDER — ONDANSETRON 8 MG/1
4 TABLET, FILM COATED ORAL EVERY 6 HOURS
Qty: 0 | Refills: 0 | Status: DISCONTINUED | OUTPATIENT
Start: 2018-11-14 | End: 2018-11-15

## 2018-11-14 RX ORDER — SODIUM CHLORIDE 9 MG/ML
1000 INJECTION, SOLUTION INTRAVENOUS
Qty: 0 | Refills: 0 | Status: DISCONTINUED | OUTPATIENT
Start: 2018-11-14 | End: 2018-11-14

## 2018-11-14 RX ADMIN — Medication 650 MILLIGRAM(S): at 11:58

## 2018-11-14 RX ADMIN — Medication 25 MILLIGRAM(S): at 11:58

## 2018-11-14 RX ADMIN — Medication 600 MILLIGRAM(S): at 12:13

## 2018-11-14 RX ADMIN — PANTOPRAZOLE SODIUM 40 MILLIGRAM(S): 20 TABLET, DELAYED RELEASE ORAL at 21:13

## 2018-11-14 RX ADMIN — SODIUM CHLORIDE 3 MILLILITER(S): 9 INJECTION INTRAMUSCULAR; INTRAVENOUS; SUBCUTANEOUS at 21:12

## 2018-11-14 RX ADMIN — Medication 20 MILLIEQUIVALENT(S): at 16:07

## 2018-11-14 RX ADMIN — ENOXAPARIN SODIUM 40 MILLIGRAM(S): 100 INJECTION SUBCUTANEOUS at 12:13

## 2018-11-14 RX ADMIN — Medication 650 MILLIGRAM(S): at 17:31

## 2018-11-14 RX ADMIN — Medication 600 MILLIGRAM(S): at 03:13

## 2018-11-14 RX ADMIN — Medication 600 MILLIGRAM(S): at 17:32

## 2018-11-14 RX ADMIN — Medication 10 MILLIGRAM(S): at 01:10

## 2018-11-14 RX ADMIN — Medication 600 MILLIGRAM(S): at 08:01

## 2018-11-14 RX ADMIN — Medication 50 MILLIEQUIVALENT(S): at 12:02

## 2018-11-14 RX ADMIN — ONDANSETRON 4 MILLIGRAM(S): 8 TABLET, FILM COATED ORAL at 05:02

## 2018-11-14 RX ADMIN — Medication 650 MILLIGRAM(S): at 05:02

## 2018-11-14 RX ADMIN — Medication 600 MILLIGRAM(S): at 08:03

## 2018-11-14 NOTE — PROGRESS NOTE ADULT - SUBJECTIVE AND OBJECTIVE BOX
GYN Progress Note  She is a  63y woman who is now post-operative day: 1    Subjective: pt was evaluated at bedside for Activing voiding trial, 240cc placed into bladder, patient only able to urinate 100cc, TOV failed. Discussion had with patient for lopez being reinserted, patient initially did not want lopez in place again; risks explained and patient understands that lopez will remain in place until AM when active voiding trial to be attempted again or to be discharged home with it today and taken out in the office in an outpatient setting. Patient presently still feeling dizzy with some ambulation,n a/w with some nausea. Reports has had similar symptoms in the past with previous procedure that was associated with anesthesia affects. But denies CP, SOB, vomiting, diarrrhea. Tolerating PO. Will give meclizin for dizzy like symptoms. Will replete potassium IV. To reassess    Physical exam:    Vital Signs Last 24 Hrs  T(F): 98.7 (14 Nov 2018 07:31), Max: 98.7 (14 Nov 2018 07:31)  HR: 84 (14 Nov 2018 07:31) (64 - 90)  BP: 130/60 (14 Nov 2018 07:31) (126/56 - 143/80)  RR: 16 (14 Nov 2018 07:31) (13 - 18)  SpO2: 97% (13 Nov 2018 22:47) (96% - 100%)    Gen: NAD  CVS: S1S2, RRR  Lungs: Lungs, Ctab, no rhonchi or rales  Abdomen: Soft, nontender, no distension   Incision: Clean, dry, and intact with steri strips  Pelvic:   Ext: No calf tenderness    Diet:     MEDICATIONS  (STANDING):  acetaminophen   Tablet .. 650 milliGRAM(s) Oral every 6 hours  enoxaparin Injectable 40 milliGRAM(s) SubCutaneous daily  ibuprofen  Tablet. 600 milliGRAM(s) Oral every 6 hours  lactated ringers. 1000 milliLiter(s) (125 mL/Hr) IV Continuous <Continuous>  metoclopramide Injectable 10 milliGRAM(s) IV Push once  pantoprazole  Injectable 40 milliGRAM(s) IV Push at bedtime    MEDICATIONS  (PRN):  chlorproMAZINE    IVPB 5 milliGRAM(s) IV Intermittent once PRN nausea  HYDROmorphone   Tablet 2 milliGRAM(s) Oral every 6 hours PRN Severe Pain (7 - 10)  ondansetron Injectable 4 milliGRAM(s) IV Push every 6 hours PRN Nausea and/or Vomiting      LABS:                        10.5   13.33 )-----------( 263      ( 14 Nov 2018 06:06 )             33.0     Antibody Screen: NEG (11-13 @ 15:15)    11-14-18 @ 06:06      141  |  98  |  12  ----------------------------<  164<H>  3.2<L>   |  26  |  0.7        Ca    9.5      14 Nov 2018 06:06

## 2018-11-14 NOTE — PROGRESS NOTE ADULT - ASSESSMENT
64 yo P9 s/p RATLH, sacrocolpopexy and cystoscopy, failed active trial of void, with symptomatic dizziness liekly secondary to anesthesia  - perform orthostatics  - pain management PRN  - IV fluids  - IV potassium  - will draw labs in AM  - reattampt trial of void in AM 11/15

## 2018-11-14 NOTE — PROGRESS NOTE ADULT - SUBJECTIVE AND OBJECTIVE BOX
OBGYN PGY2 Note:     Pt seen and evaluated at bedside. C/o headache, associated with nausea. Denies vomiting, fever, chills, diarrhea/constipation, dysuria. Denies CP/SOB/palpitations. Denies abdominal pain, vaginal bleeding.   On regular diet, has not yet attempted to eat. Matthew catheter in place, tolerating liquids, not yet passed flatus.     Vital Signs Last 24 Hrs  T(C): 36.5 (14 Nov 2018 02:58), Max: 36.9 (14 Nov 2018 00:00)  T(F): 97.7 (14 Nov 2018 02:58), Max: 98.4 (14 Nov 2018 00:00)  HR: 83 (14 Nov 2018 02:58) (64 - 90)  BP: 128/61 (14 Nov 2018 02:58) (126/56 - 143/80)  RR: 16 (14 Nov 2018 02:58) (13 - 18)  SpO2: 97% (13 Nov 2018 22:47) (96% - 100%)      Gen: NAD, AAO X 3  Heart: RRR, S1S2+  Lungs: CTA B/L, no r/r/w   Abd: soft, nontender, no r/g/r   Incision: dressings in place, c/d/i   Perineum: no active bleeding; pad count: 0  Ext: SCDs in place, no edema/tenderness      Matthew: UO: 0970-4067: 900cc    MEDICATIONS  (STANDING):  acetaminophen   Tablet .. 650 milliGRAM(s) Oral every 6 hours  enoxaparin Injectable 40 milliGRAM(s) SubCutaneous daily  ibuprofen  Tablet. 600 milliGRAM(s) Oral every 6 hours  lactated ringers. 1000 milliLiter(s) (100 mL/Hr) IV Continuous <Continuous>  metoclopramide Injectable 10 milliGRAM(s) IV Push once  pantoprazole  Injectable 40 milliGRAM(s) IV Push at bedtime    MEDICATIONS  (PRN):  chlorproMAZINE    IVPB 5 milliGRAM(s) IV Intermittent once PRN nausea  HYDROmorphone   Tablet 2 milliGRAM(s) Oral every 6 hours PRN Severe Pain (7 - 10)  ondansetron Injectable 4 milliGRAM(s) IV Push every 6 hours PRN Nausea and/or Vomiting    Labs: f/u AM CBC, BMP OBGYN PGY2 Note:     Pt seen and evaluated at bedside. C/o headache, associated with nausea. Denies vomiting, fever, chills, diarrhea/constipation, dysuria. Denies CP/SOB/palpitations. Denies abdominal pain, vaginal bleeding.   On regular diet, has not yet attempted to eat. Matthew catheter in place, tolerating liquids, not yet passed flatus.     Vital Signs Last 24 Hrs  T(C): 36.5 (14 Nov 2018 02:58), Max: 36.9 (14 Nov 2018 00:00)  T(F): 97.7 (14 Nov 2018 02:58), Max: 98.4 (14 Nov 2018 00:00)  HR: 83 (14 Nov 2018 02:58) (64 - 90)  BP: 128/61 (14 Nov 2018 02:58) (126/56 - 143/80)  RR: 16 (14 Nov 2018 02:58) (13 - 18)  SpO2: 97% (13 Nov 2018 22:47) (96% - 100%)      Gen: NAD, AAO X 3  Heart: RRR, S1S2+  Lungs: CTA B/L, no r/r/w   Abd: soft, nontender, no r/g/r   Incision: dressings in place, c/d/i   Perineum: no active bleeding; pad count: 0  Ext: SCDs in place, no edema/tenderness      Matthew: UO: 1635-1307: 900cc    MEDICATIONS  (STANDING):  acetaminophen   Tablet .. 650 milliGRAM(s) Oral every 6 hours  enoxaparin Injectable 40 milliGRAM(s) SubCutaneous daily  ibuprofen  Tablet. 600 milliGRAM(s) Oral every 6 hours  lactated ringers. 1000 milliLiter(s) (100 mL/Hr) IV Continuous <Continuous>  metoclopramide Injectable 10 milliGRAM(s) IV Push once  pantoprazole  Injectable 40 milliGRAM(s) IV Push at bedtime    MEDICATIONS  (PRN):  chlorproMAZINE    IVPB 5 milliGRAM(s) IV Intermittent once PRN nausea  HYDROmorphone   Tablet 2 milliGRAM(s) Oral every 6 hours PRN Severe Pain (7 - 10)  ondansetron Injectable 4 milliGRAM(s) IV Push every 6 hours PRN Nausea and/or Vomiting    Labs: f/u AM CBC, BMP

## 2018-11-14 NOTE — PROGRESS NOTE ADULT - ASSESSMENT
A/P: 62yo P9 with PMHx of GERD, IBS, uterine prolapse with bovine graft repair complicated postop by embolization, bleeding S/P RATLH, sacrocolpopexy, cystoscopy, EBL 150cc; POD1   - reglan for nausea  - pain mgmt prn   - encourage regular diet   - will attempt active voiding trial at 10 AM   - pad counts    - encourage ambulation/PO hydration   - incentive spirometer use   - anticipate d/c home later today   - f/u AM labs  - routine postoperative care     Dr. Hollis aware, will inform Dr. Lopez

## 2018-11-15 ENCOUNTER — TRANSCRIPTION ENCOUNTER (OUTPATIENT)
Age: 63
End: 2018-11-15

## 2018-11-15 VITALS
HEART RATE: 63 BPM | DIASTOLIC BLOOD PRESSURE: 64 MMHG | RESPIRATION RATE: 18 BRPM | SYSTOLIC BLOOD PRESSURE: 141 MMHG | OXYGEN SATURATION: 96 % | TEMPERATURE: 97 F

## 2018-11-15 LAB
ANION GAP SERPL CALC-SCNC: 10 MMOL/L — SIGNIFICANT CHANGE UP (ref 7–14)
BUN SERPL-MCNC: 12 MG/DL — SIGNIFICANT CHANGE UP (ref 10–20)
CALCIUM SERPL-MCNC: 9.1 MG/DL — SIGNIFICANT CHANGE UP (ref 8.5–10.1)
CHLORIDE SERPL-SCNC: 98 MMOL/L — SIGNIFICANT CHANGE UP (ref 98–110)
CO2 SERPL-SCNC: 28 MMOL/L — SIGNIFICANT CHANGE UP (ref 17–32)
CREAT SERPL-MCNC: 0.6 MG/DL — LOW (ref 0.7–1.5)
GLUCOSE SERPL-MCNC: 82 MG/DL — SIGNIFICANT CHANGE UP (ref 70–99)
HCT VFR BLD CALC: 27.5 % — LOW (ref 37–47)
HGB BLD-MCNC: 8.9 G/DL — LOW (ref 12–16)
MCHC RBC-ENTMCNC: 27.8 PG — SIGNIFICANT CHANGE UP (ref 27–31)
MCHC RBC-ENTMCNC: 32.4 G/DL — SIGNIFICANT CHANGE UP (ref 32–37)
MCV RBC AUTO: 85.9 FL — SIGNIFICANT CHANGE UP (ref 81–99)
NRBC # BLD: 0 /100 WBCS — SIGNIFICANT CHANGE UP (ref 0–0)
PLATELET # BLD AUTO: 191 K/UL — SIGNIFICANT CHANGE UP (ref 130–400)
POTASSIUM SERPL-MCNC: 4.1 MMOL/L — SIGNIFICANT CHANGE UP (ref 3.5–5)
POTASSIUM SERPL-SCNC: 4.1 MMOL/L — SIGNIFICANT CHANGE UP (ref 3.5–5)
RBC # BLD: 3.2 M/UL — LOW (ref 4.2–5.4)
RBC # FLD: 12.5 % — SIGNIFICANT CHANGE UP (ref 11.5–14.5)
SODIUM SERPL-SCNC: 136 MMOL/L — SIGNIFICANT CHANGE UP (ref 135–146)
WBC # BLD: 8.5 K/UL — SIGNIFICANT CHANGE UP (ref 4.8–10.8)
WBC # FLD AUTO: 8.5 K/UL — SIGNIFICANT CHANGE UP (ref 4.8–10.8)

## 2018-11-15 RX ORDER — AZTREONAM 2 G
1 VIAL (EA) INJECTION
Qty: 14 | Refills: 0 | OUTPATIENT
Start: 2018-11-15 | End: 2018-11-21

## 2018-11-15 RX ORDER — L.ACIDOPH/B.ANIMALIS/B.LONGUM 15B CELL
1 CAPSULE ORAL
Qty: 0 | Refills: 0 | COMMUNITY

## 2018-11-15 RX ORDER — MECLIZINE HCL 12.5 MG
25 TABLET ORAL ONCE
Qty: 0 | Refills: 0 | Status: COMPLETED | OUTPATIENT
Start: 2018-11-15 | End: 2018-11-15

## 2018-11-15 RX ORDER — OMEPRAZOLE 10 MG/1
1 CAPSULE, DELAYED RELEASE ORAL
Qty: 0 | Refills: 0 | COMMUNITY

## 2018-11-15 RX ORDER — HYDROMORPHONE HYDROCHLORIDE 2 MG/ML
1 INJECTION INTRAMUSCULAR; INTRAVENOUS; SUBCUTANEOUS
Qty: 20 | Refills: 0 | OUTPATIENT
Start: 2018-11-15 | End: 2018-11-19

## 2018-11-15 RX ORDER — DOCUSATE SODIUM 100 MG
1 CAPSULE ORAL
Qty: 60 | Refills: 1 | OUTPATIENT
Start: 2018-11-15 | End: 2019-01-13

## 2018-11-15 RX ORDER — IBUPROFEN 200 MG
1 TABLET ORAL
Qty: 90 | Refills: 0 | OUTPATIENT
Start: 2018-11-15 | End: 2018-12-14

## 2018-11-15 RX ADMIN — Medication 600 MILLIGRAM(S): at 09:47

## 2018-11-15 RX ADMIN — Medication 650 MILLIGRAM(S): at 12:04

## 2018-11-15 RX ADMIN — Medication 600 MILLIGRAM(S): at 01:26

## 2018-11-15 RX ADMIN — SODIUM CHLORIDE 3 MILLILITER(S): 9 INJECTION INTRAMUSCULAR; INTRAVENOUS; SUBCUTANEOUS at 05:22

## 2018-11-15 RX ADMIN — Medication 25 MILLIGRAM(S): at 09:39

## 2018-11-15 RX ADMIN — ENOXAPARIN SODIUM 40 MILLIGRAM(S): 100 INJECTION SUBCUTANEOUS at 12:04

## 2018-11-15 RX ADMIN — Medication 650 MILLIGRAM(S): at 05:24

## 2018-11-15 RX ADMIN — Medication 650 MILLIGRAM(S): at 12:03

## 2018-11-15 NOTE — DISCHARGE NOTE ADULT - PATIENT PORTAL LINK FT
You can access the [facility], offered by [facility], by registering with the following website: [email or web address]

## 2018-11-15 NOTE — CHART NOTE - NSCHARTNOTEFT_GEN_A_CORE
PGY 4 NOte    active trial of void performed. Bladder emptied. 210cc of fluid retrograde filled. Patient subsequently voided 400cc. Trial of void passed. Matthew removed.   Patient to be discharged home  Meds sent to pharmacy     Dr. Lopez aware

## 2018-11-15 NOTE — DISCHARGE NOTE ADULT - MEDICATION SUMMARY - MEDICATIONS TO TAKE
I will START or STAY ON the medications listed below when I get home from the hospital:    ibuprofen 800 mg oral tablet  -- 1 tab(s) by mouth every 8 hours   -- Do not take this drug if you are pregnant.  It is very important that you take or use this exactly as directed.  Do not skip doses or discontinue unless directed by your doctor.  May cause drowsiness or dizziness.  Obtain medical advice before taking any non-prescription drugs as some may affect the action of this medication.  Take with food or milk.    -- Indication: For pain    Dilaudid 2 mg oral tablet  -- 1 tab(s) by mouth every 6 hours MDD:4 pills, take as needed  -- Caution federal law prohibits the transfer of this drug to any person other  than the person for whom it was prescribed.  May cause drowsiness.  Alcohol may intensify this effect.  Use care when operating dangerous machinery.  This prescription cannot be refilled.  Using more of this medication than prescribed may cause serious breathing problems.    -- Indication: For pain    Xanax  -- 0.125 milligram(s) by mouth once a day (at bedtime)  -- Indication: For anxiety    ciclopirox 0.77% topical cream  -- Apply on skin to affected area 2 times a day, between toes  -- Indication: For other    famotidine 20 mg oral tablet  -- orally once a day (at bedtime)  -- Indication: For GERD    Colace 100 mg oral capsule  -- 1 cap(s) by mouth 2 times a day   -- Medication should be taken with plenty of water.    -- Indication: For constipation    Bactrim  mg-160 mg oral tablet  -- 1 tab(s) by mouth 2 times a day   -- Avoid prolonged or excessive exposure to direct and/or artificial sunlight while taking this medication.  Finish all this medication unless otherwise directed by prescriber.  Medication should be taken with plenty of water.    -- Indication: For history of MRSA

## 2018-11-15 NOTE — DISCHARGE NOTE ADULT - CARE PLAN
Principal Discharge DX:	Incomplete uterine prolapse  Goal:	healthy woman  Assessment and plan of treatment:	Nothing in the vagina for 4-6 weeks, no tampons, no douching, no baths, no sex. Showers are fine. If taking pain medication, do not operate heavy machinery. FOllow up with Dr. Lopez in 1-2 weeks. If you have a temperature of greater than 100.4, worsening abdominal pain or increased vaginal bleeding, please contact your doctor or go to the emergency room

## 2018-11-15 NOTE — PROGRESS NOTE ADULT - SUBJECTIVE AND OBJECTIVE BOX
PGY 3 Note:    Patient seen and examined at bedside. Feeling much improved from yesterday. Reports that nausea, dizziness and headache have pretty much resolved now. Abdominal pain is well controlled on pain meds. Denies fever, chills, nausea, vomiting, chest pain, shortness of breath, vaginal bleeding. Is ambulation, tolerating PO, passing flatus.     T(C): 36.6 (11-14-18 @ 23:00), Max: 37.1 (11-14-18 @ 07:31)  HR: 64 (11-14-18 @ 23:00) (64 - 84)  BP: 117/56 (11-14-18 @ 23:00) (114/59 - 130/60)  RR: 16 (11-14-18 @ 23:00) (16 - 16)    UO 3662-2532: 1150cc    Orthostatics done on 11/14/18: Supine 128/59 69, Sitting 126/58 74, Standing 114/59 74    Gen: NAD, A&Ox3  Heart: S1S2,RRR  Lungs: CTABL  Abd: ND, soft, NT, BS+, incisions with steri strips c/d/i no erythema/induration/drainage  VE: Deferred, no active bleeding noted  Ext: no edema or calf tenderness bilaterally    Labs:                        10.5   13.33 )-----------( 263      ( 14 Nov 2018 06:06 )             33.0     11-14    141  |  98  |  12  ----------------------------<  164<H>  3.2<L>   |  26  |  0.7    Ca    9.5      14 Nov 2018 06:06      Medications:  acetaminophen   Tablet .. 650 milliGRAM(s) Oral every 6 hours  chlorproMAZINE    IVPB 5 milliGRAM(s) IV Intermittent once PRN  enoxaparin Injectable 40 milliGRAM(s) SubCutaneous daily  HYDROmorphone   Tablet 2 milliGRAM(s) Oral every 6 hours PRN  ibuprofen  Tablet. 600 milliGRAM(s) Oral every 6 hours  metoclopramide Injectable 10 milliGRAM(s) IV Push once  ondansetron Injectable 4 milliGRAM(s) IV Push every 6 hours PRN  pantoprazole  Injectable 40 milliGRAM(s) IV Push at bedtime  sodium chloride 0.9% lock flush 3 milliLiter(s) IV Push every 8 hours

## 2018-11-15 NOTE — DISCHARGE NOTE ADULT - PLAN OF CARE
healthy woman Nothing in the vagina for 4-6 weeks, no tampons, no douching, no baths, no sex. Showers are fine. If taking pain medication, do not operate heavy machinery. FOllow up with Dr. Lopez in 1-2 weeks. If you have a temperature of greater than 100.4, worsening abdominal pain or increased vaginal bleeding, please contact your doctor or go to the emergency room Nothing in the vagina for 4-6 weeks, no tampons, no douching, no baths, no sex. Showers are fine. If taking pain medication, do not operate heavy machinery. Follow up with Dr. Lopez in 1-2 weeks. If you have a temperature of greater than 100.4, worsening abdominal pain or increased vaginal bleeding, please contact your doctor or go to the emergency room

## 2018-11-15 NOTE — PROGRESS NOTE ADULT - ASSESSMENT
64 yo P9 s/p RATLH, sacrocolpopexy and cystoscopy, POD 2, with failed active trial of void yesterday, doing well this morning    - F/u AM labs  - Routine post-op care  - Pain mgt prn  - re-attampt trial of void in AM  - Anticipate DC home today    Dr. Hollis at bedside, Dr. Torres to be made aware 62 yo P9 s/p RATLH, sacrocolpopexy and cystoscopy, POD 2, with failed active trial of void yesterday, doing well this morning    - F/u AM labs  - Routine post-op care  - Pain mgt prn  - re-attampt active trial of void in AM  - Anticipate DC home today    Dr. Hollis at bedside, Dr. Torres to be made aware

## 2018-11-15 NOTE — DISCHARGE NOTE ADULT - HOSPITAL COURSE
DATE OF DISCHARGE: 11-15-18 @ 06:23    PAST MEDICAL & SURGICAL HISTORY:  HTN (hypertension)  IBS (irritable bowel syndrome)  GERD (gastroesophageal reflux disease)  Cellulitis: 3x past 9 mo right foot&amp; ankle, resolved  S/P ORIF (open reduction internal fixation) fracture: left wrist  Female bladder prolapse: s/p repair 15 yrs ago    PROCEDURES PERFORMED: s/p RATLH, sacrocolpopexy, cystoscopy, .     COMPLICATIONS:  none\    Postop: Patient kept overnight due to late start time of procedure. On POD 1, patient felt dizzy. Orthostatics performed and found to be normal. Likely effect secondary to anesthesia. Patient failed active TOV. Matthew reinserted. Patient was given meclizine and continued vital and UO monitoring. On POD 2, patient dizziness resolved. Matthew had clear and good urine output.   Patient discharged home with instructions and follow up date to be given by Dr. Lopez's staff    LABS:                        10.5   13.33 )-----------( 263      ( 14 Nov 2018 06:06 )             33.0       11-14-18 @ 06:06      141  |  98  |  12  ----------------------------<  164<H>  3.2<L>   |  26  |  0.7    Ca    9.5      14 Nov 2018 06:06    Allergies    codeine (Vomiting)  morphine (Vomiting)    MEDICATIONS  (STANDING):  acetaminophen   Tablet .. 650 milliGRAM(s) Oral every 6 hours  enoxaparin Injectable 40 milliGRAM(s) SubCutaneous daily  ibuprofen  Tablet. 600 milliGRAM(s) Oral every 6 hours  metoclopramide Injectable 10 milliGRAM(s) IV Push once  pantoprazole  Injectable 40 milliGRAM(s) IV Push at bedtime  sodium chloride 0.9% lock flush 3 milliLiter(s) IV Push every 8 hours    MEDICATIONS  (PRN):  chlorproMAZINE    IVPB 5 milliGRAM(s) IV Intermittent once PRN nausea  HYDROmorphone   Tablet 2 milliGRAM(s) Oral every 6 hours PRN Severe Pain (7 - 10)  ondansetron Injectable 4 milliGRAM(s) IV Push every 6 hours PRN Nausea and/or Vomiting DATE OF DISCHARGE: 11-15-18 @ 06:23    PAST MEDICAL & SURGICAL HISTORY:  HTN (hypertension)  IBS (irritable bowel syndrome)  GERD (gastroesophageal reflux disease)  Cellulitis: 3x past 9 mo right foot&amp; ankle, resolved  S/P ORIF (open reduction internal fixation) fracture: left wrist  Female bladder prolapse: s/p repair 15 yrs ago    PROCEDURES PERFORMED: s/p RATLH, sacrocolpopexy, cystoscopy, .     COMPLICATIONS:  none\    Postop: Patient kept overnight due to late start time of procedure. On POD 1, patient felt dizzy. Orthostatics performed and found to be normal. Likely effect secondary to anesthesia. Patient failed active TOV. Matthew reinserted. Patient was given meclizine and continued vital and UO monitoring. On POD 2, patient dizziness resolved. Matthew had clear and good urine output. Second active trial of void performed. 200cc retrograde filled and 400cc output, passed trial of void. Patient discharged home with instructions and follow up date to be given by Dr. Lopez's staff    LABS:                        10.5   13.33 )-----------( 263      ( 14 Nov 2018 06:06 )             33.0       11-14-18 @ 06:06      141  |  98  |  12  ----------------------------<  164<H>  3.2<L>   |  26  |  0.7    Ca    9.5      14 Nov 2018 06:06    Allergies    codeine (Vomiting)  morphine (Vomiting)    MEDICATIONS  (STANDING):  acetaminophen   Tablet .. 650 milliGRAM(s) Oral every 6 hours  enoxaparin Injectable 40 milliGRAM(s) SubCutaneous daily  ibuprofen  Tablet. 600 milliGRAM(s) Oral every 6 hours  metoclopramide Injectable 10 milliGRAM(s) IV Push once  pantoprazole  Injectable 40 milliGRAM(s) IV Push at bedtime  sodium chloride 0.9% lock flush 3 milliLiter(s) IV Push every 8 hours    MEDICATIONS  (PRN):  chlorproMAZINE    IVPB 5 milliGRAM(s) IV Intermittent once PRN nausea  HYDROmorphone   Tablet 2 milliGRAM(s) Oral every 6 hours PRN Severe Pain (7 - 10)  ondansetron Injectable 4 milliGRAM(s) IV Push every 6 hours PRN Nausea and/or Vomiting

## 2018-11-15 NOTE — DISCHARGE NOTE ADULT - CARE PROVIDER_API CALL
Elvira Lopez), Female [facility]; Obstetrics and Gynecology  [address] [place] [address]  Phone: [phone]  Fax: [phone]

## 2018-11-16 LAB — SURGICAL PATHOLOGY STUDY: SIGNIFICANT CHANGE UP

## 2018-11-21 DIAGNOSIS — K21.9 GASTRO-ESOPHAGEAL REFLUX DISEASE WITHOUT ESOPHAGITIS: ICD-10-CM

## 2018-11-21 DIAGNOSIS — N99.820 POSTPROCEDURAL HEMORRHAGE OF A GENITOURINARY SYSTEM ORGAN OR STRUCTURE FOLLOWING A GENITOURINARY SYSTEM PROCEDURE: ICD-10-CM

## 2018-11-21 DIAGNOSIS — R42 DIZZINESS AND GIDDINESS: ICD-10-CM

## 2018-11-21 DIAGNOSIS — R39.198 OTHER DIFFICULTIES WITH MICTURITION: ICD-10-CM

## 2018-11-21 DIAGNOSIS — N81.3 COMPLETE UTEROVAGINAL PROLAPSE: ICD-10-CM

## 2018-11-21 DIAGNOSIS — N81.2 INCOMPLETE UTEROVAGINAL PROLAPSE: ICD-10-CM

## 2018-11-21 DIAGNOSIS — K58.9 IRRITABLE BOWEL SYNDROME WITHOUT DIARRHEA: ICD-10-CM

## 2018-11-21 DIAGNOSIS — T41.45XA ADVERSE EFFECT OF UNSPECIFIED ANESTHETIC, INITIAL ENCOUNTER: ICD-10-CM

## 2018-11-21 DIAGNOSIS — R51 HEADACHE: ICD-10-CM

## 2018-11-26 ENCOUNTER — APPOINTMENT (OUTPATIENT)
Age: 63
End: 2018-11-26

## 2018-11-26 VITALS
BODY MASS INDEX: 23.23 KG/M2 | WEIGHT: 148 LBS | SYSTOLIC BLOOD PRESSURE: 127 MMHG | HEART RATE: 72 BPM | HEIGHT: 67 IN | DIASTOLIC BLOOD PRESSURE: 75 MMHG

## 2018-12-24 ENCOUNTER — APPOINTMENT (OUTPATIENT)
Age: 63
End: 2018-12-24

## 2018-12-24 VITALS
SYSTOLIC BLOOD PRESSURE: 119 MMHG | HEIGHT: 67 IN | BODY MASS INDEX: 23.23 KG/M2 | HEART RATE: 68 BPM | WEIGHT: 148 LBS | DIASTOLIC BLOOD PRESSURE: 69 MMHG

## 2019-01-07 ENCOUNTER — APPOINTMENT (OUTPATIENT)
Age: 64
End: 2019-01-07

## 2019-11-14 ENCOUNTER — APPOINTMENT (OUTPATIENT)
Dept: UROGYNECOLOGY | Facility: CLINIC | Age: 64
End: 2019-11-14
Payer: COMMERCIAL

## 2019-11-14 VITALS
HEART RATE: 73 BPM | BODY MASS INDEX: 22.76 KG/M2 | DIASTOLIC BLOOD PRESSURE: 75 MMHG | HEIGHT: 67 IN | WEIGHT: 145 LBS | SYSTOLIC BLOOD PRESSURE: 119 MMHG

## 2019-11-14 DIAGNOSIS — N95.2 POSTMENOPAUSAL ATROPHIC VAGINITIS: ICD-10-CM

## 2019-11-14 DIAGNOSIS — Z87.42 PERSONAL HISTORY OF OTHER DISEASES OF THE FEMALE GENITAL TRACT: ICD-10-CM

## 2019-11-14 DIAGNOSIS — Z87.448 PERSONAL HISTORY OF OTHER DISEASES OF URINARY SYSTEM: ICD-10-CM

## 2019-11-14 DIAGNOSIS — N81.3 COMPLETE UTEROVAGINAL PROLAPSE: ICD-10-CM

## 2019-11-14 PROCEDURE — 99213 OFFICE O/P EST LOW 20 MIN: CPT

## 2019-11-14 RX ORDER — CYCLOBENZAPRINE HYDROCHLORIDE 5 MG/1
5 TABLET, FILM COATED ORAL
Qty: 60 | Refills: 1 | Status: DISCONTINUED | COMMUNITY
Start: 2018-12-24 | End: 2019-11-14

## 2019-11-14 RX ORDER — ESTRADIOL 0.1 MG/G
0.1 CREAM VAGINAL
Qty: 1 | Refills: 3 | Status: ACTIVE | COMMUNITY
Start: 2019-11-14 | End: 1900-01-01

## 2019-11-18 NOTE — HISTORY OF PRESENT ILLNESS
[FreeTextEntry1] : \par Patient is here for a one year follow up for her history of uterine prolapse\par s/p robotic hysterectomy/sacrocolpopexy/cysto 11/13/18\par \par Patient denies any defecatory dysfunction\par Patient denies any urinary dysfunction\par Patient is not sexually active secondary to male factors\par \par Patient is very happy with her postoperative results

## 2019-11-18 NOTE — COUNSELING
[FreeTextEntry1] : \par If you feel like you have an infection it is important for you to call our office and we will arrange testing of your urine.\par \par Apply a pea size amount of the cream to the opening of the vagina every night for two weeks followed by three nights per week.\par \par Please call my office if you have any issues with the cost or side effects of the medication. \par \par Please follow up as needed\par \par Please call the office with any questions, issues, or concerns. \par \par Happy Holidays!

## 2019-11-18 NOTE — DISCUSSION/SUMMARY
[FreeTextEntry1] : \par History of uterine prolapse-\par The patient is very happy with the postoperative results. Advised to continue with routine gyn care with her primary gyn provider. Patient advised to follow up with us if she has any issues . The patient voiced understanding and agrees with the plan.\par \par Atrophic vaginitis-\par We reviewed the risks, benefits, alternatives and indications of local estrogen therapy and I gave her a handout that covers this information. She does opt to begin this therapy. I have given her a prescription and specific instructions on how to use the estrogen cream, applied with a finger at a low dose for urogenital atrophy.\par \par \par

## 2020-06-26 NOTE — PATIENT PROFILE ADULT - DO YOU FEEL UNSAFE AT HOME, WORK, OR SCHOOL?
apixaban 5 mg oral tablet: 1 tab(s) orally every 12 hours  ascorbic acid 500 mg oral tablet: 1 tab(s) orally 2 times a day  ferrous sulfate 324 mg (65 mg elemental iron) oral tablet: orally once a day  hydrALAZINE 10 mg oral tablet: 1 tab(s) orally 2 times a day  ipratropium CFC free 17 mcg/inh inhalation aerosol: 2 puff(s) inhaled every 6 hours  isosorbide dinitrate 5 mg oral tablet: 1 tab(s) orally 3 times a day  levothyroxine 25 mcg (0.025 mg) oral tablet: 1 tab(s) orally once a day  metoprolol succinate 50 mg oral tablet, extended release: 1 tab(s) orally once a day  omeprazole 40 mg oral delayed release capsule: 1 cap(s) orally once a day  Senna 8.6 mg oral tablet: 1 tab(s) orally once a day (at bedtime)  tamsulosin 0.4 mg oral capsule: 1 cap(s) orally once a day (at bedtime)  Uloric 40 mg oral tablet: 1 tab(s) orally once a day  Ventolin HFA 90 mcg/inh inhalation aerosol: 2 puff(s) inhaled every 6 hours no

## 2020-08-17 ENCOUNTER — APPOINTMENT (OUTPATIENT)
Dept: UROGYNECOLOGY | Facility: CLINIC | Age: 65
End: 2020-08-17
Payer: COMMERCIAL

## 2020-08-17 VITALS
WEIGHT: 145 LBS | HEIGHT: 67 IN | BODY MASS INDEX: 22.76 KG/M2 | SYSTOLIC BLOOD PRESSURE: 135 MMHG | DIASTOLIC BLOOD PRESSURE: 79 MMHG

## 2020-08-17 DIAGNOSIS — M79.10 MYALGIA, UNSPECIFIED SITE: ICD-10-CM

## 2020-08-17 DIAGNOSIS — K59.00 CONSTIPATION, UNSPECIFIED: ICD-10-CM

## 2020-08-17 DIAGNOSIS — Z87.898 PERSONAL HISTORY OF OTHER SPECIFIED CONDITIONS: ICD-10-CM

## 2020-08-17 PROCEDURE — 99214 OFFICE O/P EST MOD 30 MIN: CPT | Mod: 25

## 2020-08-17 PROCEDURE — 51700 IRRIGATION OF BLADDER: CPT

## 2020-08-23 PROBLEM — Z87.898 HISTORY OF URINARY RETENTION: Status: ACTIVE | Noted: 2020-08-23

## 2020-08-23 NOTE — COUNSELING
[FreeTextEntry1] : \par Please start the bowel recipe every night for constipation control\par \par Please call the office if you feel that you are urinating differently than usual\par \par If you have another episode of not emptying well, then I will recommend starting a muscle relaxant to help relax the pelvic muscles\par \par Call with any issues\par \par Follow up as needed

## 2020-08-23 NOTE — HISTORY OF PRESENT ILLNESS
[FreeTextEntry1] : \par The patient is here for follow up for her urinary retention\par Spoke to the patient on August 13 2020 and she reported that she had gone to the ER in [facility] because she was not urinating well. Found to have urinary retention and a lopez was placed. She was told by the ER provider that her bladder was prolapsing. She also reports that she had two days of constipation prior to the lopez needing to be placed.\par \par s/p robotic hysterectomy/sacrocolpoexy/cysto 11/13/18

## 2020-08-23 NOTE — DISCUSSION/SUMMARY
[FreeTextEntry1] : \par Myalgia-\par Discussed the pathophysiology of the above condition. Reviewed management options including medications (oral or vaginal suppository), injections, pelvic floor physical therapy, or referral for possible pudendal nerve blocks. The patient agrees to starting flexeril again if she starts having difficulty urinating.\par \par Constipation-\par The patient was counseled about her defecatory dysfunction. We discussed the importance of fiber, hydration and exercise. She was given a handout with specific information about dietary fiber and ways to relieve constipation.\par \par History of urinary retention-\par Patient presents to the office for a voiding trial. Patient identified. Procedure explained to the patient. Patient has bag which is draining 200 cc clear, yellow urine. Patient placed in lithotomy position.  Lopez disconnected from bag. Instilled 300 cc sterile water into bladder until patient felt a strong urge to void. Using a 10cc syringe, catheter balloon deflated and lopez catheter removed without difficulty. Assisted patient to the commode where she voided 400 cc without difficulty.\par \par Advised the patient and her  in detail that the only thing on the pathway that can intermittenly contribute to urinary retention is pelvic floor hypertonicity. (Detrusor pressure, urethral pressure, and obstructive voiding (including prolapse) does not change on it's own). I advised the patient to make sure that her stools always remain soft and if she has another episode of urinary retention or difficulty urinating then we should consider starting flexeril for pelvic muscle hypertonicity. The patient and her  voiced understanding and are happy with this plan.\par \par \par \par

## 2020-08-23 NOTE — PHYSICAL EXAM
[Chaperone Present] : A chaperone was present in the examining room during all aspects of the physical examination [No Acute Distress] : in no acute distress [Well developed] : well developed [Well Nourished] : ~L well nourished [FreeTextEntry1] : Bilateral levator ani spasms and mild tenderness\par No mesh exposure\par Aa Ba -1.5

## 2020-08-31 ENCOUNTER — FORM ENCOUNTER (OUTPATIENT)
Age: 65
End: 2020-08-31

## 2020-12-07 ENCOUNTER — FORM ENCOUNTER (OUTPATIENT)
Age: 65
End: 2020-12-07

## 2020-12-08 VITALS
HEIGHT: 68 IN | DIASTOLIC BLOOD PRESSURE: 75 MMHG | WEIGHT: 142 LBS | BODY MASS INDEX: 21.52 KG/M2 | SYSTOLIC BLOOD PRESSURE: 135 MMHG

## 2021-01-05 ENCOUNTER — FORM ENCOUNTER (OUTPATIENT)
Age: 66
End: 2021-01-05

## 2021-10-19 ENCOUNTER — FORM ENCOUNTER (OUTPATIENT)
Age: 66
End: 2021-10-19

## 2022-10-06 ENCOUNTER — NON-APPOINTMENT (OUTPATIENT)
Age: 67
End: 2022-10-06

## 2022-10-06 DIAGNOSIS — Z87.19 PERSONAL HISTORY OF OTHER DISEASES OF THE DIGESTIVE SYSTEM: ICD-10-CM

## 2022-10-06 DIAGNOSIS — Z78.9 OTHER SPECIFIED HEALTH STATUS: ICD-10-CM

## 2022-10-06 DIAGNOSIS — Z78.0 ASYMPTOMATIC MENOPAUSAL STATE: ICD-10-CM

## 2022-10-06 DIAGNOSIS — Z92.89 PERSONAL HISTORY OF OTHER MEDICAL TREATMENT: ICD-10-CM

## 2022-10-25 ENCOUNTER — APPOINTMENT (OUTPATIENT)
Dept: OBGYN | Facility: CLINIC | Age: 67
End: 2022-10-25

## 2022-10-25 ENCOUNTER — LABORATORY RESULT (OUTPATIENT)
Age: 67
End: 2022-10-25

## 2022-10-25 VITALS
SYSTOLIC BLOOD PRESSURE: 138 MMHG | WEIGHT: 154.5 LBS | DIASTOLIC BLOOD PRESSURE: 78 MMHG | HEIGHT: 66 IN | BODY MASS INDEX: 24.83 KG/M2

## 2022-10-25 DIAGNOSIS — Z01.419 ENCOUNTER FOR GYNECOLOGICAL EXAMINATION (GENERAL) (ROUTINE) W/OUT ABNORMAL FINDINGS: ICD-10-CM

## 2022-10-25 DIAGNOSIS — Z87.39 PERSONAL HISTORY OF OTHER DISEASES OF THE MUSCULOSKELETAL SYSTEM AND CONNECTIVE TISSUE: ICD-10-CM

## 2022-10-25 LAB
BILIRUB UR QL STRIP: NORMAL
GLUCOSE UR-MCNC: NORMAL
HCG UR QL: 0.2 EU/DL
HGB UR QL STRIP.AUTO: NORMAL
KETONES UR-MCNC: NORMAL
LEUKOCYTE ESTERASE UR QL STRIP: NORMAL
NITRITE UR QL STRIP: NORMAL
PH UR STRIP: 6
PROT UR STRIP-MCNC: NORMAL
SP GR UR STRIP: 1.01

## 2022-10-25 PROCEDURE — G0101: CPT

## 2022-10-25 PROCEDURE — 81003 URINALYSIS AUTO W/O SCOPE: CPT | Mod: QW

## 2022-10-25 NOTE — HISTORY OF PRESENT ILLNESS
[FreeTextEntry1] : 66 y/o [id number] LMP age 54 here for [employer].  no bleeding, pain or discharge.  No urinary sx  Feels occasional bloating.  No GI sx..  colonoscopy 2021 reports normal.  Mammo and sono 2021 normal.  Bone density still with osteoporosis, on Prolea.  s/p robotic assisted total lsc hysterctomy, sacral colpopexy and cystourethrosocopy by dr. ricky madrigal,  h/o APR, ssf, and post op hematoma with lavonne pratt \par \par nsd x 9, all nsd\par Crohns, no meds\par osteoporosis, prolea

## 2022-10-25 NOTE — PLAN
[FreeTextEntry1] : 68 y/o p9 with normal exam, osteoporosis, chrons\par stable\par pap sent from office\par for mammo and sono\par cont prolea \par for abdominal pelvic sono\par f/u for above

## 2022-10-25 NOTE — PHYSICAL EXAM
[Chaperone Present] : A chaperone was present in the examining room during all aspects of the physical examination [Appropriately responsive] : appropriately responsive [Alert] : alert [No Acute Distress] : no acute distress [Soft] : soft [Non-tender] : non-tender [Non-distended] : non-distended [No HSM] : No HSM [No Lesions] : no lesions [No Mass] : no mass [Oriented x3] : oriented x3 [Examination Of The Breasts] : a normal appearance [No Masses] : no breast masses were palpable [Labia Majora] : normal [Labia Minora] : normal [Normal] : normal [Uterine Adnexae] : normal [FreeTextEntry1] : [place]

## 2022-10-27 LAB
ALBUMIN SERPL ELPH-MCNC: 4.6 G/DL
ALP BLD-CCNC: 70 U/L
ALT SERPL-CCNC: 16 U/L
ANION GAP SERPL CALC-SCNC: 14 MMOL/L
AST SERPL-CCNC: 16 U/L
BASOPHILS # BLD AUTO: 0.07 K/UL
BASOPHILS NFR BLD AUTO: 0.9 %
BILIRUB SERPL-MCNC: 0.2 MG/DL
BUN SERPL-MCNC: 14 MG/DL
CALCIUM SERPL-MCNC: 9.9 MG/DL
CHLORIDE SERPL-SCNC: 104 MMOL/L
CHOLEST SERPL-MCNC: 277 MG/DL
CO2 SERPL-SCNC: 24 MMOL/L
CREAT SERPL-MCNC: 0.7 MG/DL
EGFR: 96 ML/MIN/1.73M2
EOSINOPHIL # BLD AUTO: 0.1 K/UL
EOSINOPHIL NFR BLD AUTO: 1.3 %
GLUCOSE SERPL-MCNC: 95 MG/DL
HCT VFR BLD CALC: 41.7 %
HDLC SERPL-MCNC: 39 MG/DL
HGB BLD-MCNC: 11.6 G/DL
HPV HIGH+LOW RISK DNA PNL CVX: NOT DETECTED
IMM GRANULOCYTES NFR BLD AUTO: 0.4 %
LDLC SERPL CALC-MCNC: 176 MG/DL
LYMPHOCYTES # BLD AUTO: 2.3 K/UL
LYMPHOCYTES NFR BLD AUTO: 29.6 %
MAN DIFF?: NORMAL
MCHC RBC-ENTMCNC: 27.8 GM/DL
MCHC RBC-ENTMCNC: 27.8 PG
MCV RBC AUTO: 100 FL
MONOCYTES # BLD AUTO: 0.59 K/UL
MONOCYTES NFR BLD AUTO: 7.6 %
NEUTROPHILS # BLD AUTO: 4.69 K/UL
NEUTROPHILS NFR BLD AUTO: 60.2 %
NONHDLC SERPL-MCNC: 238 MG/DL
PLATELET # BLD AUTO: 299 K/UL
POTASSIUM SERPL-SCNC: 3.9 MMOL/L
PROT SERPL-MCNC: 7.3 G/DL
RBC # BLD: 4.17 M/UL
RBC # FLD: 13.4 %
SODIUM SERPL-SCNC: 142 MMOL/L
TRIGL SERPL-MCNC: 308 MG/DL
WBC # FLD AUTO: 7.78 K/UL

## 2022-11-02 LAB — CYTOLOGY CVX/VAG DOC THIN PREP: ABNORMAL

## 2023-11-16 NOTE — PRE-ANESTHESIA EVALUATION ADULT - NSANTHDISPORD_GEN_ALL_CORE
Get pap smear  Labs today    Dr. Crow     Preventive Health Recommendations  Female Ages 50 - 64    Yearly exam: See your health care provider every year in order to  Review health changes.   Discuss preventive care.    Review your medicines if your doctor has prescribed any.    Get a Pap test every three years (unless you have an abnormal result and your provider advises testing more often).  If you get Pap tests with HPV test, you only need to test every 5 years, unless you have an abnormal result.   You do not need a Pap test if your uterus was removed (hysterectomy) and you have not had cancer.  You should be tested each year for STDs (sexually transmitted diseases) if you're at risk.   Have a mammogram every 1 to 2 years.  Have a colonoscopy at age 45, or have a yearly FIT test (stool test). These exams screen for colon cancer.    Have a cholesterol test every 5 years, or more often if advised.  Have a diabetes test (fasting glucose) every three years. If you are at risk for diabetes, you should have this test more often.   If you are at risk for osteoporosis (brittle bone disease), think about having a bone density scan (DEXA).    Shots: Get a flu shot each year. Get a tetanus shot every 10 years.    Nutrition:   Eat at least 5 servings of fruits and vegetables each day.  Eat whole-grain bread, whole-wheat pasta and brown rice instead of white grains and rice.  Get adequate Calcium and Vitamin D.     Lifestyle  Exercise at least 150 minutes a week (30 minutes a day, 5 days a week). This will help you control your weight and prevent disease.  Limit alcohol to one drink per day.  No smoking.   Wear sunscreen to prevent skin cancer.   See your dentist every six months for an exam and cleaning.  See your eye doctor every 1 to 2 years.    
PACU
PACU

## 2023-11-21 ENCOUNTER — APPOINTMENT (OUTPATIENT)
Dept: OBGYN | Facility: CLINIC | Age: 68
End: 2023-11-21

## 2023-11-21 VITALS — DIASTOLIC BLOOD PRESSURE: 79 MMHG | SYSTOLIC BLOOD PRESSURE: 153 MMHG | BODY MASS INDEX: 23.57 KG/M2 | WEIGHT: 146 LBS

## 2023-11-21 LAB
BILIRUB UR QL STRIP: NORMAL
GLUCOSE UR-MCNC: NORMAL
HCG UR QL: 0.2 EU/DL
HGB UR QL STRIP.AUTO: NORMAL
KETONES UR-MCNC: NORMAL
LEUKOCYTE ESTERASE UR QL STRIP: NORMAL
NITRITE UR QL STRIP: NORMAL
PH UR STRIP: 5.5
PROT UR STRIP-MCNC: NORMAL
SP GR UR STRIP: 1.02

## 2025-01-30 ENCOUNTER — APPOINTMENT (OUTPATIENT)
Dept: OBGYN | Facility: CLINIC | Age: 70
End: 2025-01-30

## 2025-03-13 ENCOUNTER — APPOINTMENT (OUTPATIENT)
Dept: OBGYN | Facility: CLINIC | Age: 70
End: 2025-03-13

## 2025-08-25 ENCOUNTER — NON-APPOINTMENT (OUTPATIENT)
Age: 70
End: 2025-08-25

## 2025-08-27 ENCOUNTER — APPOINTMENT (OUTPATIENT)
Dept: OBGYN | Facility: CLINIC | Age: 70
End: 2025-08-27
Payer: MEDICARE

## 2025-08-27 VITALS
DIASTOLIC BLOOD PRESSURE: 68 MMHG | SYSTOLIC BLOOD PRESSURE: 113 MMHG | BODY MASS INDEX: 23.78 KG/M2 | WEIGHT: 148 LBS | HEIGHT: 66 IN | HEART RATE: 67 BPM

## 2025-08-27 DIAGNOSIS — Z01.419 ENCOUNTER FOR GYNECOLOGICAL EXAMINATION (GENERAL) (ROUTINE) W/OUT ABNORMAL FINDINGS: ICD-10-CM

## 2025-08-27 LAB
BILIRUB UR QL STRIP: NORMAL
GLUCOSE UR-MCNC: NORMAL
HCG UR QL: 0.2 EU/DL
HGB UR QL STRIP.AUTO: NORMAL
KETONES UR-MCNC: NORMAL
LEUKOCYTE ESTERASE UR QL STRIP: NORMAL
NITRITE UR QL STRIP: NORMAL
PH UR STRIP: 5.5
PROT UR STRIP-MCNC: NORMAL
SP GR UR STRIP: 1.01

## 2025-08-27 PROCEDURE — G0101: CPT

## 2025-08-27 PROCEDURE — 81003 URINALYSIS AUTO W/O SCOPE: CPT | Mod: QW

## 2025-08-28 DIAGNOSIS — R10.31 RIGHT LOWER QUADRANT PAIN: ICD-10-CM

## 2025-08-28 DIAGNOSIS — R10.32 RIGHT LOWER QUADRANT PAIN: ICD-10-CM

## 2025-08-28 LAB — HPV HIGH+LOW RISK DNA PNL CVX: NOT DETECTED

## 2025-09-01 LAB
BACTERIA UR CULT: NORMAL
CYTOLOGY CVX/VAG DOC THIN PREP: ABNORMAL